# Patient Record
Sex: FEMALE | Race: OTHER | Employment: STUDENT | URBAN - METROPOLITAN AREA
[De-identification: names, ages, dates, MRNs, and addresses within clinical notes are randomized per-mention and may not be internally consistent; named-entity substitution may affect disease eponyms.]

---
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
## 2024-02-11 ENCOUNTER — HOSPITAL ENCOUNTER (EMERGENCY)
Facility: HOSPITAL | Age: 19
Discharge: HOME | End: 2024-02-11
Attending: EMERGENCY MEDICINE
Payer: COMMERCIAL

## 2024-02-11 VITALS
BODY MASS INDEX: 24.84 KG/M2 | OXYGEN SATURATION: 100 % | HEART RATE: 63 BPM | RESPIRATION RATE: 20 BRPM | HEIGHT: 62 IN | DIASTOLIC BLOOD PRESSURE: 73 MMHG | WEIGHT: 135 LBS | TEMPERATURE: 98 F | SYSTOLIC BLOOD PRESSURE: 110 MMHG

## 2024-02-11 DIAGNOSIS — N30.00 ACUTE CYSTITIS WITHOUT HEMATURIA: Primary | ICD-10-CM

## 2024-02-11 LAB
BILIRUB UR QL: NEGATIVE
GLUCOSE UR-MCNC: NORMAL MG/DL
KETONES UR-MCNC: NEGATIVE MG/DL
LEUKOCYTE ESTERASE UR QL STRIP.AUTO: 500
NITRITE UR QL STRIP.AUTO: NEGATIVE
PH UR: 5.5 [PH] (ref 5–8)
PROT UR-MCNC: 50 MG/DL
RBC #/AREA URNS AUTO: >10 /HPF
SP GR UR STRIP: 1.02 (ref 1–1.03)
UROBILINOGEN UR STRIP-ACNC: NORMAL
WBC #/AREA URNS AUTO: >50 /HPF

## 2024-02-11 PROCEDURE — 81001 URINALYSIS AUTO W/SCOPE: CPT | Performed by: EMERGENCY MEDICINE

## 2024-02-11 PROCEDURE — 99283 EMERGENCY DEPT VISIT LOW MDM: CPT

## 2024-02-11 PROCEDURE — 99284 EMERGENCY DEPT VISIT MOD MDM: CPT

## 2024-02-11 RX ORDER — CEPHALEXIN 500 MG/1
500 CAPSULE ORAL ONCE
Status: COMPLETED | OUTPATIENT
Start: 2024-02-11 | End: 2024-02-11

## 2024-02-11 RX ORDER — CEPHALEXIN 500 MG/1
500 CAPSULE ORAL 4 TIMES DAILY
Qty: 28 CAPSULE | Refills: 0 | Status: SHIPPED | OUTPATIENT
Start: 2024-02-11 | End: 2024-02-18

## 2024-02-11 NOTE — ED INITIAL ASSESSMENT (HPI)
Pt presents to ed with c/o urinary symptoms. Pt states vaginal pain, dysuria, frequency, and hematuria since last night. Pt states there is blood in her urine but is unsure if it is from her menstrual period.     No meds pta.

## 2024-02-12 NOTE — ED PROVIDER NOTES
Patient Seen in: [facility] Emergency Department    History     Chief Complaint   Patient presents with    Urinary Symptoms       HPI    The patient presents to the ED complaining of dysuria and frequency as well as some blood in her urine and starting last night.  She denies any flank pain or fevers.  Denies other complaints.    History reviewed.   Past Medical History:   Diagnosis Date    Visual impairment     glasses       History reviewed.   Past Surgical History:   Procedure Laterality Date    KNEE SURGERY Right 12/2021    KNEE SURGERY Left 07/2021         Medications :  (Not in a hospital admission)       Family History   Problem Relation Age of Onset    No Known Problems Father     No Known Problems Mother     Diabetes Maternal Grandmother     Diabetes Maternal Grandfather     Asthma Brother     Breast Cancer Neg     Ovarian Cancer Neg     Uterine Cancer Neg     Colon Cancer Neg        Smoking Status:   Social History     Socioeconomic History    Marital status: Single   Tobacco Use    Smoking status: Never    Smokeless tobacco: Never   Vaping Use    Vaping Use: Never used   Substance and Sexual Activity    Alcohol use: Never    Drug use: Never    Sexual activity: Yes     Partners: Male     Birth control/protection: OCP, Condom   Other Topics Concern    Second-hand smoke exposure No     Comment: Dad-outside    Alcohol/drug concerns No    Violence concerns No       Constitutional and vital signs reviewed.      Social History and Family History elements reviewed from today, pertinent positives to the presenting problem noted.    Physical Exam     ED Triage Vitals [02/11/24 1709]   BP (!) 142/95   Pulse 73   Resp 18   Temp 97.6 °F (36.4 °C)   Temp src Temporal   SpO2 100 %   O2 Device None (Room air)       All measures to prevent infection transmission during my interaction with the patient were taken. The patient was already wearing a droplet mask on my arrival to the room. Personal protective  equipment was worn throughout the duration of the exam.  Handwashing was performed prior to and after the exam.  Stethoscope and any equipment used during my examination was cleaned with super sani-cloth germicidal wipes following the exam.     Physical Exam  Vitals and nursing note reviewed.   Constitutional:       General: She is not in acute distress.     Appearance: She is well-developed.   HENT:      Head: Normocephalic and atraumatic.   Eyes:      General:         Right eye: No discharge.         Left eye: No discharge.      Conjunctiva/sclera: Conjunctivae normal.   Neck:      Trachea: No tracheal deviation.   Cardiovascular:      Rate and Rhythm: Normal rate.   Pulmonary:      Effort: Pulmonary effort is normal. No respiratory distress.      Breath sounds: No stridor.   Abdominal:      General: There is no distension.      Palpations: Abdomen is soft.      Tenderness: There is no abdominal tenderness.   Musculoskeletal:         General: No deformity.   Skin:     General: Skin is warm and dry.   Neurological:      Mental Status: She is alert and oriented to person, place, and time.   Psychiatric:         Mood and Affect: Mood normal.         Behavior: Behavior normal.         ED Course        Labs Reviewed   URINALYSIS, ROUTINE - Abnormal; Notable for the following components:       Result Value    Urine Color Azra (*)     Clarity Urine Ex.Turbid (*)     Blood Urine 3+ (*)     Protein Urine 50 (*)     Leukocyte Esterase Urine 500 (*)     WBC Urine >50 (*)     RBC Urine >10 (*)     All other components within normal limits       As Interpreted by me    Imaging Results Available and Reviewed while in ED: No results found.  ED Medications Administered:   Medications   cephalexin (Keflex) cap 500 mg (500 mg Oral Given 2/11/24 1902)         MDM     Vitals:    02/11/24 1709 02/11/24 1900   BP: (!) 142/95 110/73   Pulse: 73 63   Resp: 18 20   Temp: 97.6 °F (36.4 °C)    TempSrc: Temporal    SpO2: 100% 100%   Weight:  61.2 kg    Height: 157.5 cm (5' 2\")      *I personally reviewed and interpreted all ED vitals.    Pulse Ox: 100%, Room air, Normal     Monitor Interpretation:   normal sinus rhythm as interpreted by me.  The cardiac monitor was ordered to monitor heart rate.    Differential Diagnosis/ Diagnostic Considerations: UTI, hematuria, other    Complicating Factors: The patient already has does not have any pertinent problems on file. to contribute to the complexity of this ED evaluation.    Medical Decision Making  The patient presents to the ED with urinary tract infection symptoms.  No flank pain or fever to suggest pyelonephritis.  Patient nondistressed on exam.  Will prescribe Keflex for treatment and recommended return precautions.    Problems Addressed:  Acute cystitis without hematuria: acute illness or injury    Amount and/or Complexity of Data Reviewed  Labs: ordered. Decision-making details documented in ED Course.    Risk  Prescription drug management.        Condition upon leaving the department: Stable    Disposition and Plan     Clinical Impression:  1. Acute cystitis without hematuria        Disposition:  Discharge    Follow-up:  No follow-up provider specified.    Medications Prescribed:  Discharge Medication List as of 2/11/2024  7:03 PM        START taking these medications    Details   cephalexin (KEFLEX) 500 MG Oral Cap Take 1 capsule (500 mg total) by mouth 4 (four) times daily for 7 days., Normal, Disp-28 capsule, R-0

## 2024-03-12 ENCOUNTER — TELEPHONE (OUTPATIENT)
Dept: ORTHOPEDICS CLINIC | Facility: CLINIC | Age: 19
End: 2024-03-12

## 2024-03-12 ENCOUNTER — OFFICE VISIT (OUTPATIENT)
Facility: CLINIC | Age: 19
End: 2024-03-12
Payer: COMMERCIAL

## 2024-03-12 ENCOUNTER — HOSPITAL ENCOUNTER (OUTPATIENT)
Dept: GENERAL RADIOLOGY | Age: 19
Discharge: HOME | End: 2024-03-12
Attending: PHYSICIAN ASSISTANT
Payer: COMMERCIAL

## 2024-03-12 VITALS — WEIGHT: 135 LBS | BODY MASS INDEX: 24.84 KG/M2 | HEIGHT: 62 IN

## 2024-03-12 DIAGNOSIS — M25.562 LEFT KNEE PAIN, UNSPECIFIED CHRONICITY: ICD-10-CM

## 2024-03-12 DIAGNOSIS — M25.562 ACUTE PAIN OF LEFT KNEE: Primary | ICD-10-CM

## 2024-03-12 PROCEDURE — 99203 OFFICE O/P NEW LOW 30 MIN: CPT | Performed by: PHYSICIAN ASSISTANT

## 2024-03-12 PROCEDURE — 73564 X-RAY EXAM KNEE 4 OR MORE: CPT | Performed by: PHYSICIAN ASSISTANT

## 2024-03-12 RX ORDER — IBUPROFEN 600 MG/1
TABLET ORAL
COMMUNITY
Start: 2024-03-09

## 2024-03-12 NOTE — TELEPHONE ENCOUNTER
Fmla forms received via [employer]. Forms logged for processing. [employer] message sent for completion of ROSHAN.
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
## 2024-03-20 NOTE — TELEPHONE ENCOUNTER
Received 9 pgs fax from [employer] regarding patient's Short Term Disability forms, emailed and sent original to Forms department for completion.

## 2024-03-20 NOTE — TELEPHONE ENCOUNTER
Please try to avoid signing forms in the corner as it is not visible when printing and forms are not accepted this way. Thank you!     Mookie Day,      *The ACKNOWLEDGE button has been moved to the top right ribbon*    Please sign off on form if you agree to: FMLA   (place your signature on the first page only)    -From your Inbasket, Highlight the patient and click Chart   -Double click the 03/12/2024 Forms Completion telephone encounter  -Scroll down to the Media section   -Click the blue Hyperlink: NICOLAS Day 03/20/2024  -Click Acknowledge located in the top right ribbon/menu   -Drag the mouse into the blank space of the document and a + sign will appear. Left click to   electronically sign the document.     Thank you,  Kassy SESAY

## 2024-03-27 ENCOUNTER — OFFICE VISIT (OUTPATIENT)
Dept: ORTHOPEDICS CLINIC | Facility: CLINIC | Age: 19
End: 2024-03-27
Payer: COMMERCIAL

## 2024-03-27 DIAGNOSIS — M25.562 ACUTE PAIN OF LEFT KNEE: Primary | ICD-10-CM

## 2024-03-27 PROCEDURE — 99213 OFFICE O/P EST LOW 20 MIN: CPT | Performed by: PHYSICIAN ASSISTANT

## 2024-03-28 NOTE — TELEPHONE ENCOUNTER
Disab forms received via fax in forms dept. Logged for processing.  msg sent for ROSHAN completion.

## 2024-04-02 NOTE — TELEPHONE ENCOUNTER
NICOLAS Barton -     Please sign off on this form if you agree to:      STD - Left Knee Pain       Starting: 3/28/2024       Endin2024    (place your signature on the first page only)     -From your Inbasket, Highlight the patient and click \"Chart\"   -Double click the 3/12/2024 Forms Completion telephone encounter  -Scroll down to the Media section   -Click the blue Hyperlinks: ABRAHAM / NICOLAS Day / 2024  -Click Acknowledge located in the top right ribbon/menu (it  has been moved)  -Drag the mouse into the blank space of the document and a + sign will   appear. Left click to electronically sign the document.     Thank you for your time and assistance!     [facility]

## 2024-04-05 NOTE — TELEPHONE ENCOUNTER
STD forms have been completed, and sent to PT as an attachment, via [employer], due to missing ROSHAN/Authorization for Alli Kumar.

## 2024-08-28 ENCOUNTER — HOSPITAL ENCOUNTER (OUTPATIENT)
Age: 19
Discharge: HOME | End: 2024-08-28
Payer: COMMERCIAL

## 2024-08-28 VITALS
DIASTOLIC BLOOD PRESSURE: 74 MMHG | HEART RATE: 91 BPM | RESPIRATION RATE: 20 BRPM | SYSTOLIC BLOOD PRESSURE: 116 MMHG | OXYGEN SATURATION: 100 % | TEMPERATURE: 98 F

## 2024-08-28 DIAGNOSIS — Z20.822 COVID-19 RULED OUT BY LABORATORY TESTING: ICD-10-CM

## 2024-08-28 DIAGNOSIS — J06.9 VIRAL URI WITH COUGH: Primary | ICD-10-CM

## 2024-08-28 LAB
S PYO AG THROAT QL: NEGATIVE
SARS-COV-2 RNA RESP QL NAA+PROBE: NOT DETECTED

## 2024-08-28 PROCEDURE — 99214 OFFICE O/P EST MOD 30 MIN: CPT | Performed by: NURSE PRACTITIONER

## 2024-08-28 PROCEDURE — 87880 STREP A ASSAY W/OPTIC: CPT | Performed by: NURSE PRACTITIONER

## 2024-08-28 PROCEDURE — U0002 COVID-19 LAB TEST NON-CDC: HCPCS | Performed by: NURSE PRACTITIONER

## 2024-08-28 RX ORDER — BENZONATATE 100 MG/1
100 CAPSULE ORAL 3 TIMES DAILY PRN
Qty: 30 CAPSULE | Refills: 0 | Status: SHIPPED | OUTPATIENT
Start: 2024-08-28 | End: 2024-09-07

## 2024-08-28 RX ORDER — ALBUTEROL SULFATE 90 UG/1
2 AEROSOL, METERED RESPIRATORY (INHALATION) EVERY 4 HOURS PRN
Qty: 1 EACH | Refills: 0 | Status: SHIPPED | OUTPATIENT
Start: 2024-08-28 | End: 2024-09-27

## 2024-08-29 NOTE — ED PROVIDER NOTES
Patient Seen in: [facility]      History     Chief Complaint   Patient presents with    Cough     Stated Complaint: COUGH    Subjective: This is a 19-year-old female, no significant past medical history, presents to immediate care clinic for evaluation of cough, congestion, sore throat for the past 3 days.  Cough is dry and nonproductive.  Patient denies any headache, fever, chills, fatigue.  She is without ear pain, postnasal drip, abdominal pain, nausea, vomiting, diarrhea.  Positive recent travel to [place].  Patient has been taking DayQuil and NyQuil to minimal alleviation of symptoms.  She is a non-smoker.  Well-appearing.  No chest pain, dizziness, lightheadedness, palpitations, shortness of breath.  Speaking in complete and full sentences without difficulty.  AOx4.  The history is provided by the patient.           Objective:   Past Medical History:    Visual impairment    glasses              Past Surgical History:   Procedure Laterality Date    Knee surgery Right 12/2021    Knee surgery Left 07/2021                Social History     Socioeconomic History    Marital status: Single   Tobacco Use    Smoking status: Never    Smokeless tobacco: Never    Tobacco comments:     Updated 3/12/24   Vaping Use    Vaping status: Never Used   Substance and Sexual Activity    Alcohol use: Never    Drug use: Never    Sexual activity: Yes     Partners: Male     Birth control/protection: OCP, Condom   Other Topics Concern    Second-hand smoke exposure No     Comment: Dad-outside    Alcohol/drug concerns No    Violence concerns No   Social History Narrative    6th grade                    Review of Systems   Constitutional: Negative.  Negative for activity change, appetite change, chills, fatigue and fever.   HENT:  Positive for congestion and sore throat. Negative for ear discharge, ear pain, postnasal drip, rhinorrhea, sinus pressure, sinus pain and sneezing.    Eyes: Negative.    Respiratory:  Positive for  cough. Negative for chest tightness, shortness of breath, wheezing and stridor.    Cardiovascular: Negative.  Negative for chest pain, palpitations and leg swelling.   Gastrointestinal: Negative.  Negative for abdominal pain, diarrhea, nausea and vomiting.   Musculoskeletal: Negative.    Skin: Negative.    Neurological: Negative.        Positive for stated Chief Complaint: Cough    Other systems are as noted in HPI.  Constitutional and vital signs reviewed.      All other systems reviewed and negative except as noted above.    Physical Exam     ED Triage Vitals   BP 08/28/24 1919 116/74   Pulse 08/28/24 1919 91   Resp 08/28/24 1919 20   Temp 08/28/24 1920 97.8 °F (36.6 °C)   Temp src 08/28/24 1920 Oral   SpO2 08/28/24 1919 100 %   O2 Device 08/28/24 1919 None (Room air)       Current Vitals:   Vital Signs  BP: 116/74  Pulse: 91  Resp: 20  Temp: 97.8 °F (36.6 °C)  Temp src: Oral    Oxygen Therapy  SpO2: 100 %  O2 Device: None (Room air)            Physical Exam  Constitutional:       General: She is not in acute distress.     Appearance: Normal appearance. She is not ill-appearing.   HENT:      Head: Normocephalic.      Jaw: There is normal jaw occlusion.      Right Ear: Tympanic membrane, ear canal and external ear normal.      Left Ear: Tympanic membrane, ear canal and external ear normal.      Nose: Congestion present. No rhinorrhea.      Mouth/Throat:      Lips: Pink.      Mouth: Mucous membranes are moist.      Pharynx: Oropharynx is clear. Uvula midline. Posterior oropharyngeal erythema present. No oropharyngeal exudate or uvula swelling.   Eyes:      General:         Right eye: No discharge.         Left eye: No discharge.      Extraocular Movements: Extraocular movements intact.      Pupils: Pupils are equal, round, and reactive to light.   Cardiovascular:      Rate and Rhythm: Normal rate and regular rhythm.      Pulses: Normal pulses.      Heart sounds: Normal heart sounds.   Pulmonary:      Effort:  Pulmonary effort is normal. No respiratory distress.      Breath sounds: Normal breath sounds. No stridor. No wheezing, rhonchi or rales.   Chest:      Chest wall: No tenderness.   Musculoskeletal:         General: Normal range of motion.      Cervical back: Normal range of motion and neck supple.   Lymphadenopathy:      Cervical: No cervical adenopathy.   Skin:     General: Skin is warm.      Capillary Refill: Capillary refill takes less than 2 seconds.   Neurological:      General: No focal deficit present.      Mental Status: She is alert and oriented to person, place, and time.               ED Course     Labs Reviewed   POCT RAPID STREP - Normal   RAPID SARS-COV-2 BY PCR - Normal                      MDM      Differentials considered include: COVID-19, strep pharyngitis, viral URI    Patient with dry cough on exam.  Lungs auscultated, no wheezing, crackles, consolidation.  No diminished breath sounds.  No tachypnea, tachycardia, hypoxia.  No evidence of pneumonia.  Low suspicion for bronchitis.    Patient posterior pharynx erythematous.  1+ tonsil enlargement without edema or exudate.  Uvula midline and normal in size per mucous membranes moist.  No intraoral lesions petechiae.  No cervical lymphadenopathy.  Patient is tolerating her own secretions well without difficulty.  No excessive drooling, stridor, voice change.  No evidence to suggest peritonsillar abscess.    Patient is negative on rapid strep test.    Patient is negative for COVID-19.    Likely viral URI.  Educated patient on supportive symptomatic treatment at home.  She is aware to drink plenty of water and get plenty of rest.  She should sleep somewhat elevated and upright.  Sleep with humidifier.  Steam showers cough and congestion.    For throat discomfort, patient is aware to perform salt water gargles daily, drink warm tea with honey and lemon, Cepacol lozenges over-the-counter.    I did prescribe Tessalon Perles that she can take up to 3 times  a day and albuterol inhaler as needed for coughing fits.    Patient is aware that most respiratory viruses take about 2 to 3 weeks to resolve with coughing last symptom to resolve.  Patient verbalized understanding.    Patient is aware of signs symptoms that warrant immediate ER evaluation.  She verbalized understand agrees with plan of care.                                     Medical Decision Making      Disposition and Plan     Clinical Impression:  1. Viral URI with cough    2. COVID-19 ruled out by laboratory testing         Disposition:  Discharge  8/28/2024  7:53 pm    Follow-up:  Kristy Dempsey MD  [address] [place] [id number] [phone]      As needed          Medications Prescribed:  Discharge Medication List as of 8/28/2024  7:54 PM        START taking these medications    Details   benzonatate 100 MG Oral Cap Take 1 capsule (100 mg total) by mouth 3 (three) times daily as needed for cough., Normal, Disp-30 capsule, R-0      albuterol 108 (90 Base) MCG/ACT Inhalation Aero Soln Inhale 2 puffs into the lungs every 4 (four) hours as needed for Wheezing., Normal, Disp-1 each, R-0

## 2024-08-29 NOTE — ED INITIAL ASSESSMENT (HPI)
Cough, nasal congestion x3 days. Reports throat irritation from cough. Denies fever. Taking dayquil and nyquil.

## 2024-08-29 NOTE — DISCHARGE INSTRUCTIONS
You are negative for strep throat.  You are negative for COVID-19.  Lungs are clear.  Likely viral illness.  Supportive and symptomatic treatment at home.  Drink plenty of water and electrolytes.  Get plenty of rest.  Sleep somewhat elevated upright.  Sleep with humidifier.  Steam showers for cough and congestion.    For throat discomfort, recommend salt water gargles, warm tea with honey and lemon, Cepacol lozenges over-the-counter.    Tylenol every 4 hours Motrin every 6 hours.    I have prescribed Tessalon Perles which you can take up to 3 times a day as needed for cough suppressant. Albuterol inhaler for any coughing fits or wheezing.    Please be aware that most respiratory viruses take about 2 weeks to resolve.    Please go to ER if you have any chest pain, dizziness, lightness, palpitations, shortness of breath.

## 2024-09-16 ENCOUNTER — LAB ENCOUNTER (OUTPATIENT)
Dept: LAB | Age: 19
End: 2024-09-16
Attending: FAMILY MEDICINE
Payer: COMMERCIAL

## 2024-09-16 ENCOUNTER — OFFICE VISIT (OUTPATIENT)
Dept: FAMILY MEDICINE CLINIC | Facility: CLINIC | Age: 19
End: 2024-09-16

## 2024-09-16 VITALS
TEMPERATURE: 98 F | WEIGHT: 138 LBS | DIASTOLIC BLOOD PRESSURE: 81 MMHG | BODY MASS INDEX: 25.4 KG/M2 | HEIGHT: 62 IN | SYSTOLIC BLOOD PRESSURE: 119 MMHG | HEART RATE: 67 BPM

## 2024-09-16 DIAGNOSIS — Z23 NEED FOR TDAP VACCINATION: ICD-10-CM

## 2024-09-16 DIAGNOSIS — R05.1 ACUTE COUGH: ICD-10-CM

## 2024-09-16 DIAGNOSIS — R49.0 VOICE HOARSENESS: ICD-10-CM

## 2024-09-16 DIAGNOSIS — Z00.00 WELL ADULT EXAM: Primary | ICD-10-CM

## 2024-09-16 LAB
ALBUMIN SERPL-MCNC: 4.5 G/DL (ref 3.2–4.8)
ALBUMIN/GLOB SERPL: 1.6 {RATIO} (ref 1–2)
ALP LIVER SERPL-CCNC: 84 U/L
ALT SERPL-CCNC: 23 U/L
ANION GAP SERPL CALC-SCNC: 7 MMOL/L (ref 0–18)
AST SERPL-CCNC: 24 U/L (ref ?–34)
BASOPHILS # BLD AUTO: 0.04 X10(3) UL (ref 0–0.2)
BASOPHILS NFR BLD AUTO: 0.5 %
BILIRUB SERPL-MCNC: 0.6 MG/DL (ref 0.3–1.2)
BUN BLD-MCNC: 11 MG/DL (ref 9–23)
BUN/CREAT SERPL: 15.9 (ref 10–20)
CALCIUM BLD-MCNC: 9.5 MG/DL (ref 8.7–10.4)
CHLORIDE SERPL-SCNC: 105 MMOL/L (ref 98–112)
CHOLEST SERPL-MCNC: 210 MG/DL (ref ?–200)
CO2 SERPL-SCNC: 28 MMOL/L (ref 21–32)
CREAT BLD-MCNC: 0.69 MG/DL
DEPRECATED RDW RBC AUTO: 47.2 FL (ref 35.1–46.3)
EGFRCR SERPLBLD CKD-EPI 2021: 127 ML/MIN/1.73M2 (ref 60–?)
EOSINOPHIL # BLD AUTO: 0.18 X10(3) UL (ref 0–0.7)
EOSINOPHIL NFR BLD AUTO: 2.3 %
ERYTHROCYTE [DISTWIDTH] IN BLOOD BY AUTOMATED COUNT: 14.6 % (ref 11–15)
FASTING PATIENT LIPID ANSWER: YES
FASTING STATUS PATIENT QL REPORTED: YES
GLOBULIN PLAS-MCNC: 2.9 G/DL (ref 2–3.5)
GLUCOSE BLD-MCNC: 79 MG/DL (ref 70–99)
HCT VFR BLD AUTO: 42 %
HDLC SERPL-MCNC: 53 MG/DL (ref 40–59)
HGB BLD-MCNC: 13.5 G/DL
IMM GRANULOCYTES # BLD AUTO: 0.02 X10(3) UL (ref 0–1)
IMM GRANULOCYTES NFR BLD: 0.3 %
LDLC SERPL CALC-MCNC: 141 MG/DL (ref ?–100)
LYMPHOCYTES # BLD AUTO: 2.94 X10(3) UL (ref 1–4)
LYMPHOCYTES NFR BLD AUTO: 36.9 %
MCH RBC QN AUTO: 28.8 PG (ref 26–34)
MCHC RBC AUTO-ENTMCNC: 32.1 G/DL (ref 31–37)
MCV RBC AUTO: 89.6 FL
MONOCYTES # BLD AUTO: 0.56 X10(3) UL (ref 0.1–1)
MONOCYTES NFR BLD AUTO: 7 %
NEUTROPHILS # BLD AUTO: 4.22 X10 (3) UL (ref 1.5–7.7)
NEUTROPHILS # BLD AUTO: 4.22 X10(3) UL (ref 1.5–7.7)
NEUTROPHILS NFR BLD AUTO: 53 %
NONHDLC SERPL-MCNC: 157 MG/DL (ref ?–130)
OSMOLALITY SERPL CALC.SUM OF ELEC: 288 MOSM/KG (ref 275–295)
PLATELET # BLD AUTO: 391 10(3)UL (ref 150–450)
POTASSIUM SERPL-SCNC: 3.8 MMOL/L (ref 3.5–5.1)
PROT SERPL-MCNC: 7.4 G/DL (ref 5.7–8.2)
RBC # BLD AUTO: 4.69 X10(6)UL
SODIUM SERPL-SCNC: 140 MMOL/L (ref 136–145)
TRIGL SERPL-MCNC: 88 MG/DL (ref 30–149)
TSI SER-ACNC: 1.52 MIU/ML (ref 0.55–4.78)
VLDLC SERPL CALC-MCNC: 16 MG/DL (ref 0–30)
WBC # BLD AUTO: 8 X10(3) UL (ref 4–11)

## 2024-09-16 PROCEDURE — 80053 COMPREHEN METABOLIC PANEL: CPT | Performed by: FAMILY MEDICINE

## 2024-09-16 PROCEDURE — 36415 COLL VENOUS BLD VENIPUNCTURE: CPT | Performed by: FAMILY MEDICINE

## 2024-09-16 PROCEDURE — 85025 COMPLETE CBC W/AUTO DIFF WBC: CPT | Performed by: FAMILY MEDICINE

## 2024-09-16 PROCEDURE — 80061 LIPID PANEL: CPT | Performed by: FAMILY MEDICINE

## 2024-09-16 PROCEDURE — 84443 ASSAY THYROID STIM HORMONE: CPT | Performed by: FAMILY MEDICINE

## 2024-09-16 RX ORDER — FLUTICASONE PROPIONATE 50 MCG
2 SPRAY, SUSPENSION (ML) NASAL NIGHTLY
Qty: 1 EACH | Refills: 0 | Status: SHIPPED | OUTPATIENT
Start: 2024-09-16 | End: 2025-09-11

## 2024-09-16 NOTE — PROGRESS NOTES
HPI:    Patient ID: Lilliam Temple is a 19 year old female.    HPI  Chief Complaint   Patient presents with    Well Adult    Urgent Care F/u     Still has a cough for over two weeks sometimes gasps for air has been using the inhaler          Wt Readings from Last 6 Encounters:   09/16/24 138 lb (62.6 kg)   03/12/24 135 lb (61.2 kg)   02/11/24 135 lb (61.2 kg)   11/14/23 134 lb (60.8 kg) (60%, Z= 0.26)*   10/12/23 134 lb 14.7 oz (61.2 kg) (62%, Z= 0.31)*   10/12/23 135 lb (61.2 kg) (62%, Z= 0.31)*     * Growth percentiles are based on [facility] (Girls, 2-20 Years) data.     BP Readings from Last 3 Encounters:   09/16/24 119/81   08/28/24 116/74   02/11/24 110/73       New patient   Was seen for cough 8/28  Feels better  Using albuterol and helps.  Sometimes wheezing.    Had no illness, just had a dry cough then had productive  No fever, no nasal congestion  Voice hoarseness +  No shortness of breath     On ocps since 4-5 yrs.  No leg pain/ no leg swelling    Past knee injuries/ surgeries.    Working as a phlebotist at [employer]  In school - wants to be dental hygienist    Non smoker    Sexually active     Review of Systems   Constitutional:  Negative for activity change, appetite change, chills, fatigue, fever and unexpected weight change.   HENT:  Negative for congestion, dental problem, drooling, ear discharge, ear pain, facial swelling, hearing loss, mouth sores, nosebleeds, postnasal drip, rhinorrhea, sinus pressure, sinus pain, sneezing, sore throat, tinnitus, trouble swallowing and voice change.    Eyes:  Negative for pain, discharge, redness and visual disturbance.   Respiratory:  Positive for cough. Negative for shortness of breath and wheezing.    Cardiovascular:  Negative for chest pain, palpitations and leg swelling.   Gastrointestinal:  Negative for abdominal pain, anal bleeding, blood in stool, constipation, diarrhea, nausea, rectal pain and vomiting.   Endocrine: Negative for cold intolerance, heat  intolerance, polydipsia, polyphagia and polyuria.   Genitourinary:  Negative for decreased urine volume, difficulty urinating, dysuria, flank pain, frequency, menstrual problem, pelvic pain, urgency, vaginal bleeding, vaginal discharge and vaginal pain.        Periods are regular     Musculoskeletal:  Negative for arthralgias, back pain and myalgias.   Skin:  Negative for rash.   Neurological:  Negative for dizziness, seizures, syncope, weakness, numbness and headaches.   Hematological:  Does not bruise/bleed easily.   Psychiatric/Behavioral:  Negative for behavioral problems, decreased concentration, self-injury, sleep disturbance and suicidal ideas. The patient is not nervous/anxious.        /81   Pulse 67   Temp 97.5 °F (36.4 °C)   Ht 5' 2\" (1.575 m)   Wt 138 lb (62.6 kg)   LMP 09/12/2024 (Exact Date)   BMI 25.24 kg/m²     Past Medical History:    Visual impairment    glasses     Past Surgical History:   Procedure Laterality Date    Knee surgery Right 12/2021    [facility]    Knee surgery Left 07/2021    [facility]     Social History     Socioeconomic History    Marital status: Single     Spouse name: Not on file    Number of children: Not on file    Years of education: Not on file    Highest education level: Not on file   Occupational History    Not on file   Tobacco Use    Smoking status: Never    Smokeless tobacco: Never    Tobacco comments:     Updated 3/12/24   Vaping Use    Vaping status: Never Used   Substance and Sexual Activity    Alcohol use: Never    Drug use: Never    Sexual activity: Yes     Partners: Male     Birth control/protection: OCP, Condom   Other Topics Concern    Second-hand smoke exposure No     Comment: Dad-outside    Alcohol/drug concerns No    Violence concerns No   Social History Narrative    6th grade           Social Determinants of Health     Financial Resource Strain: Not on file   Food Insecurity: Not on file   Transportation Needs: Not on file   Physical Activity: Not on file    Stress: Not on file   Social Connections: Not on file   Housing Stability: Not on file     Family History   Problem Relation Age of Onset    No Known Problems Father     No Known Problems Mother     Diabetes Maternal Grandmother     Diabetes Maternal Grandfather     Asthma Brother     Breast Cancer Neg     Ovarian Cancer Neg     Uterine Cancer Neg     Colon Cancer Neg        Immunization History   Administered Date(s) Administered    Covid-19 Vaccine Pfizer 30 mcg/0.3 ml 05/01/2021, 05/22/2021    DTAP 04/13/2004, 06/14/2004, 08/10/2004, 05/10/2005, 08/06/2009    FLU VAC QIV SPLIT 3 YRS AND OLDER (87010) 01/08/2022    FLUZONE 6 months and older PFS 0.5 ml (73321) 09/24/2020    HEP A,Ped/Adol,(2 Dose) 07/11/2019, 03/13/2021    HEP B 04/13/2004, 06/14/2004, 08/10/2004    HIB 04/13/2004, 06/14/2004, 08/10/2004, 05/10/2005    Hpv Virus Vaccine 9 Jessie Im 05/24/2017, 06/01/2018    IPV 04/13/2004, 06/14/2004, 08/10/2004, 08/06/2009    MMR 02/08/2005, 08/06/2009    Meningococcal-Menactra 04/02/2015    Meningococcal-Menveo 2month-55yr 03/13/2021    Pneumococcal Vaccine, Conjugate 04/13/2004, 06/14/2004, 08/10/2004, 08/16/2005    TDAP 03/20/2014    Varicella 02/08/2005, 08/06/2009       Health Maintenance   Topic Date Due    Annual Depression Screening  01/01/2024    Annual Physical  03/15/2024    DTaP,Tdap,and Td Vaccines (7 - Td or Tdap) 03/20/2024    COVID-19 Vaccine (3 - 2023-24 season) 09/01/2024    Influenza Vaccine (1) 10/01/2024    Chlamydia Screening  11/14/2024    Pneumococcal Vaccine: Birth to 64yrs  Completed    Hepatitis B Vaccines  Completed    Hepatitis A Vaccines  Completed    MMR Vaccines  Completed    Varicella Vaccines  Completed    Meningococcal Vaccine  Completed    HPV Vaccines  Completed          Current Outpatient Medications   Medication Sig Dispense Refill    fluticasone propionate 50 MCG/ACT Nasal Suspension 2 sprays by Each Nare route nightly. 1 each 0    albuterol 108 (90 Base) MCG/ACT Inhalation  Aero Soln Inhale 2 puffs into the lungs every 4 (four) hours as needed for Wheezing. 1 each 0    ibuprofen 600 MG Oral Tab TAKE 1 TABLET BY MOUTH EVERY 6 HOURS FOR 5 DAYS      Norethin Ace-Eth Estrad-FE 1-20 MG-MCG Oral Tab Take 1 tablet by mouth daily. 84 tablet 4     Allergies:No Known Allergies   PHYSICAL EXAM:     Chief Complaint   Patient presents with    Well Adult    Urgent Care F/u     Still has a cough for over two weeks sometimes gasps for air has been using the inhaler         Physical Exam  Vitals and nursing note reviewed.   Constitutional:       Appearance: She is well-developed.   HENT:      Head: Normocephalic and atraumatic.      Right Ear: External ear normal.      Left Ear: External ear normal.      Nose: Nose normal.      Mouth/Throat:      Pharynx: No oropharyngeal exudate.   Eyes:      General:         Right eye: No discharge.         Left eye: No discharge.      Conjunctiva/sclera: Conjunctivae normal.      Pupils: Pupils are equal, round, and reactive to light.   Neck:      Thyroid: No thyromegaly.   Cardiovascular:      Rate and Rhythm: Normal rate and regular rhythm.      Heart sounds: Normal heart sounds. No murmur heard.  Pulmonary:      Effort: Pulmonary effort is normal.      Breath sounds: Normal breath sounds. No wheezing.   Abdominal:      General: Bowel sounds are normal.      Palpations: Abdomen is soft. There is no mass.      Tenderness: There is no abdominal tenderness.   Musculoskeletal:         General: No tenderness.      Cervical back: Normal range of motion and neck supple.   Lymphadenopathy:      Cervical: No cervical adenopathy.   Skin:     General: Skin is dry.      Findings: No rash.   Neurological:      Mental Status: She is alert and oriented to person, place, and time.      Cranial Nerves: No cranial nerve deficit.      Motor: No abnormal muscle tone.      Coordination: Coordination normal.      Deep Tendon Reflexes: Reflexes are normal and symmetric. Reflexes normal.    Psychiatric:         Behavior: Behavior normal.         Thought Content: Thought content normal.         Judgment: Judgment normal.                ASSESSMENT/PLAN:     Return yearly for physicals  Follow up with dentist every 6 months  Follow up with eye doctor yearly  Recommend aerobic exercise for at least 30mins 5 days a week  Yearly flu shot  Tetanus booster every 10 years (Tdap/ Td)  Labs ordered/ or reviewed if done prior to appointment     Encounter Diagnoses   Name Primary?    Well adult exam Yes    Need for Tdap vaccination     Acute cough     Voice hoarseness        1. Well adult exam    - CBC With Differential With Platelet  - Comp Metabolic Panel (14)  - Lipid Panel  - TSH W Reflex To Free T4    2. Need for Tdap vaccination  tdap    3. Acute cough  Better   Add the flonase  - fluticasone propionate 50 MCG/ACT Nasal Suspension; 2 sprays by Each Nare route nightly.  Dispense: 1 each; Refill: 0    4. Voice hoarseness  Add flonase, continue antihistamine  - fluticasone propionate 50 MCG/ACT Nasal Suspension; 2 sprays by Each Nare route nightly.  Dispense: 1 each; Refill: 0      Orders Placed This Encounter   Procedures    CBC With Differential With Platelet    Comp Metabolic Panel (14)    Lipid Panel    TSH W Reflex To Free T4       The above note was creating using Dragon speech recognition technology. Please excuse any typos    Meds This Visit:  Requested Prescriptions     Signed Prescriptions Disp Refills    fluticasone propionate 50 MCG/ACT Nasal Suspension 1 each 0     Si sprays by Each Nare route nightly.       Imaging & Referrals:  None       ID#[id number]

## 2025-05-30 ENCOUNTER — NURSE TRIAGE (OUTPATIENT)
Dept: FAMILY MEDICINE CLINIC | Facility: CLINIC | Age: 20
End: 2025-05-30

## 2025-05-30 DIAGNOSIS — Z23 NEED FOR VACCINATION: Primary | ICD-10-CM

## 2025-05-30 NOTE — TELEPHONE ENCOUNTER
Dr Mills ==see below, please review lab result and order if booster is needed.     Please reply to pool: EM RN TRIAGE       Patient wants to follow up on her lab results.   Inform that we have the final results, but Dr. Mills still needs to review them.   She is also asking if she needs to schedule for a booster if Dr. Mills recommends it.       Informed that RN will send the message to Dr. Mills to review the test results and get an order for the booster if needed. She needs to contact our office at [phone] say APPOINTMENT to schedule an RN visit. States that her school form for the immunization is due on 6/12/25.        LAST visit 9/16/24.       No future appointments.

## 2025-05-31 NOTE — TELEPHONE ENCOUNTER
Patient reviewed results and plan in result notes as per below:  Sanjay Mills MD to Lilliam Temple        5/30/25  7:09 PM  You are immune to MMR, and hep B but NOT to chicken pox,  You will need 2 doses of chicken pox vaccine 1 month apart as long as not pregnant.  TB blood test is negative.  Please schedule nurse visit for chicken pox vaccine  Results released through My Chart.       Last read by Lilliam Temple at 7:45PM on 5/30/2025.  Hepatitis B Surface Antibody; Mumps Antibodies, IGG-Immunity; Rubella Antibodies, IgG; Rubeola(Measles)Antibodies, IGG-Immunity; Varicella Zoster, IGG (1 more orders)

## 2025-06-04 ENCOUNTER — NURSE ONLY (OUTPATIENT)
Dept: FAMILY MEDICINE CLINIC | Facility: CLINIC | Age: 20
End: 2025-06-04
Payer: COMMERCIAL

## 2025-06-04 DIAGNOSIS — Z23 NEED FOR TDAP VACCINATION: Primary | ICD-10-CM

## 2025-06-04 PROCEDURE — 90471 IMMUNIZATION ADMIN: CPT | Performed by: FAMILY MEDICINE

## 2025-06-04 PROCEDURE — 90716 VAR VACCINE LIVE SUBQ: CPT | Performed by: FAMILY MEDICINE

## 2025-06-06 ENCOUNTER — APPOINTMENT (OUTPATIENT)
Dept: OCCUPATIONAL MEDICINE | Age: 20
End: 2025-06-06
Attending: NURSE PRACTITIONER

## 2025-06-27 ENCOUNTER — PATIENT MESSAGE (OUTPATIENT)
Dept: FAMILY MEDICINE CLINIC | Facility: CLINIC | Age: 20
End: 2025-06-27

## 2025-07-05 ENCOUNTER — LAB ENCOUNTER (OUTPATIENT)
Dept: LAB | Age: 20
End: 2025-07-05
Attending: FAMILY MEDICINE
Payer: COMMERCIAL

## 2025-07-05 PROCEDURE — 36415 COLL VENOUS BLD VENIPUNCTURE: CPT | Performed by: FAMILY MEDICINE

## 2025-07-05 PROCEDURE — 86787 VARICELLA-ZOSTER ANTIBODY: CPT | Performed by: FAMILY MEDICINE

## 2025-07-07 LAB — VZV IGG SER IA-ACNC: 3.45 (ref 1–?)

## 2025-07-14 ENCOUNTER — OFFICE VISIT (OUTPATIENT)
Dept: OBGYN CLINIC | Facility: CLINIC | Age: 20
End: 2025-07-14
Payer: COMMERCIAL

## 2025-07-14 VITALS
BODY MASS INDEX: 26 KG/M2 | HEART RATE: 59 BPM | DIASTOLIC BLOOD PRESSURE: 69 MMHG | SYSTOLIC BLOOD PRESSURE: 106 MMHG | WEIGHT: 139.81 LBS

## 2025-07-14 DIAGNOSIS — Z12.4 SCREENING FOR CERVICAL CANCER: ICD-10-CM

## 2025-07-14 DIAGNOSIS — Z11.3 SCREENING EXAMINATION FOR STD (SEXUALLY TRANSMITTED DISEASE): ICD-10-CM

## 2025-07-14 DIAGNOSIS — Z30.011 ENCOUNTER FOR INITIAL PRESCRIPTION OF CONTRACEPTIVE PILLS: ICD-10-CM

## 2025-07-14 DIAGNOSIS — N89.8 VAGINAL DISCHARGE: ICD-10-CM

## 2025-07-14 DIAGNOSIS — Z01.419 ENCOUNTER FOR WELL WOMAN EXAM: Primary | ICD-10-CM

## 2025-07-14 PROCEDURE — 81514 NFCT DS BV&VAGINITIS DNA ALG: CPT | Performed by: STUDENT IN AN ORGANIZED HEALTH CARE EDUCATION/TRAINING PROGRAM

## 2025-07-14 PROCEDURE — 87591 N.GONORRHOEAE DNA AMP PROB: CPT | Performed by: STUDENT IN AN ORGANIZED HEALTH CARE EDUCATION/TRAINING PROGRAM

## 2025-07-14 PROCEDURE — 87491 CHLMYD TRACH DNA AMP PROBE: CPT | Performed by: STUDENT IN AN ORGANIZED HEALTH CARE EDUCATION/TRAINING PROGRAM

## 2025-07-14 RX ORDER — NORETHINDRONE ACETATE AND ETHINYL ESTRADIOL 1MG-20(21)
1 KIT ORAL DAILY
Qty: 28 UNDEFINED | Refills: 12 | Status: SHIPPED | OUTPATIENT
Start: 2025-07-14

## 2025-07-14 NOTE — PROGRESS NOTES
[facility]  Obstetrics and Gynecology  Annual  Shade Antonio PA-C      The following individual(s) verbally consented to be recorded using ambient AI listening technology and understand that they can each withdraw their consent to this listening technology at any point by asking the clinician to turn off or pause the recording:    Patient name: Lilliam Temple is a 20 year old female  presenting today for her annual well woman exam.     History of Present Illness  Lilliam Temple is a 20 year old female who presents for an annual physical exam.    She experiences irregular menstrual cycles since stopping birth control over a year ago, with periods skipping months or occurring late. The most recent cycle involved four to five days of brown spotting with clots. Previously, her periods were irregular and heavy, but now they are irregular and not heavy. There is no excessive facial hair growth or abnormal nipple discharge. No recent hormonal blood work has been done.    She has symptoms of bacterial vaginosis, including odor and itching, similar to past episodes. She has used gel and pill treatments before, preferring the pill. She is not using consistent contraception but occasionally uses condoms with her boyfriend of four years. Requests STD screening today.    She has no known medication allergies, denies diabetes, and has no family history of breast, ovarian, uterine, or colon cancer. She does not use tobacco. Denies any hematuria, dysuria, pain or lumps in the breasts, or other concerning symptoms.    Patient's last menstrual period was 2025 (exact date).     Pap: n/a   Contraception:none  Gardasil: completed    OBSTETRICS HISTORY:     OB History    Para Term  AB Living   0 0 0 0 0    SAB IAB Ectopic Multiple Live Births   0 0 0         GYNE HISTORY:     Menarche: 10 (2025  1:00 PM)  Period Cycle (Days): irregular (2025  1:00 PM)  Period Duration (Days): 7 days  (7/14/2025  1:00 PM)  Period Flow: heavy with clots over the last 2 periods (7/14/2025  1:00 PM)  Use of Birth Control (if yes, specify type): None (7/14/2025  1:00 PM)  Hx Prior Abnormal Pap: No (7/14/2025  1:00 PM)      History   Sexual Activity    Sexual activity: Yes    Partners: Male    Birth control/ protection: Condom, Contraceptive patch            No data to display                  MEDICAL HISTORY:     Past Medical History[1]   Past Surgical History[2]    SOCIAL HISTORY:     Tobacco Use: Low Risk  (7/14/2025)    Patient History     Smoking Tobacco Use: Never     Smokeless Tobacco Use: Never     Passive Exposure: Never       Depression Screening:   Depression Screening (PHQ-2/PHQ-9): Over the LAST 2 WEEKS   Little interest or pleasure in doing things (over the last two weeks)?: Not at all  Little interest or pleasure in doing things: Not at all    Feeling down, depressed, or hopeless (over the last two weeks)?: Not at all  Feeling down, depressed, or hopeless: Not at all    PHQ-2 SCORE: 0  PHQ-2 SCORE: 0          FAMILY HISTORY:     Family History[3]    MEDICATIONS:     Medications - Current[4]    ALLERGIES:     Allergies[5]    REVIEW OF SYSTEMS:     Review of Systems   Constitutional:  Negative for chills, fever and unexpected weight change.   Respiratory: Negative.     Cardiovascular: Negative.    Gastrointestinal:  Negative for abdominal pain, constipation, diarrhea and nausea.   Genitourinary:  Positive for vaginal bleeding. Negative for dyspareunia, dysuria, genital sores, hematuria, menstrual problem, pelvic pain, vaginal discharge and vaginal pain.   Musculoskeletal: Negative.    Skin: Negative.    Neurological: Negative.    Hematological: Negative.    Psychiatric/Behavioral: Negative.           PHYSICAL EXAM:     Vitals:    07/14/25 1257   BP: 106/69   Pulse: 59   Weight: 139 lb 12.8 oz (63.4 kg)       Body mass index is 25.57 kg/m².     Chaperone offered, pt declined.  Constitutional: well  developed, well nourished  Psychiatric:  Oriented to time, place, person and situation. Appropriate mood and affect  Head/Face: normocephalic  Neck/Thyroid: thyroid symmetric, no thyromegaly, no nodules, no adenopathy  Lymphatic:no abnormal supraclavicular or axillary adenopathy is noted  Breast: normal without palpable masses, tenderness, asymmetry, nipple discharge, nipple retraction or skin changes  Abdomen:  soft, nontender, nondistended, no masses  Skin/Hair: no unusual rashes or bruises  Extremities: no edema, no cyanosis    Pelvic Exam:  External Genitalia: normal appearance, hair distribution, and no lesions  Urethral Meatus:  normal in size, location, without lesions and prolapse  Bladder:  No fullness, masses or tenderness  Vagina:  Normal appearance without lesions, no abnormal discharge  Cervix:  Normal without tenderness on motion  Uterus: normal in size, contour, position, mobility, without tenderness  Adnexa: normal without masses or tenderness  Perineum: normal  Anus: no hemorroids       ASSESMENT & PLAN:     Assessment & Plan  Irregular Menstrual Cycles  Irregular cycles post-birth control discontinuation. Differential includes hormonal imbalances, PCOS, or stress. Previous thyroid function normal. Menstrual regulation possible with hormonal birth control.  - Initiate Loestrin birth control pills to regulate cycles.  - Consider hormonal workup if symptoms persist or worsen.    Contraception Counseling  Discussed contraceptive options including pills and IUDs. Prefers to avoid weight gain. Explained IUD insertion process and potential discomfort.  - Start Loestrin birth control pills as preferred.  - Provide information on IUDs for future consideration.    Vaginal discharge  Recurrent symptoms of odor and itching. Previous treatments included gel and pills.  - Perform vaginal cultures to confirm diagnosis.  - Hold off on antibiotics until culture results are available.  - Consider oral antibiotics if  cultures confirm bacterial vaginosis.    General Health Maintenance  Annual gynecological exam. No significant family history of cancer. Previous elevated cholesterol.  - Perform Pap smear.  - Perform STD screening including chlamydia, gonorrhea, and trichomonas.      SUMMARY:  Pap: Next cotest 3-5 years per ASCCP guidelines.  BCM:  None  STD screening: GC/Chl/Trich only, declines blood STD screen, condoms encouraged  Mammogram: n/a -- once 39 yrs old  HM updated    ORDERS:   No orders of the defined types were placed in this encounter.    PRESCRIPTIONS:     Requested Prescriptions      No prescriptions requested or ordered in this encounter     IMAGING/ REFERRALS:    None   FOLLOW-UP     No follow-ups on file.    TRAVIS BETANCOURT PA-C  1:02 PM  7/14/2025    Note to patient and family:  The 21st Century Cures Act makes medical notes available to patients in the interest of transparency.  However, please be advised that this is a medical document.  It is intended as a peer to peer communication.  It is written in medical language and may contain abbreviations or verbiage that are technical and unfamiliar.  It may appear blunt or direct.  Medical documents are intended to carry relevant information, facts as evident, and the clinical opinion of the practitioner.         [1]   Past Medical History:  Diagnosis Date    Visual impairment     glasses   [2]   Past Surgical History:  Procedure Laterality Date    Knee surgery Right 12/2021    [facility]    Knee surgery Left 07/2021    [facility]    Other surgical history  July and dec    Knee surgery on my left in july and right knee surgery in dec   [3]   Family History  Problem Relation Age of Onset    No Known Problems Father     No Known Problems Mother     Diabetes Maternal Grandmother     Diabetes Maternal Grandfather     Asthma Brother     Breast Cancer Neg     Ovarian Cancer Neg     Uterine Cancer Neg     Colon Cancer Neg    [4]   Current Outpatient Medications:     fluticasone  propionate 50 MCG/ACT Nasal Suspension, 2 sprays by Each Nare route nightly., Disp: 1 each, Rfl: 0    ibuprofen 600 MG Oral Tab, TAKE 1 TABLET BY MOUTH EVERY 6 HOURS FOR 5 DAYS, Disp: , Rfl:   [5] No Known Allergies

## 2025-07-15 LAB
BV BACTERIA DNA VAG QL NAA+PROBE: NEGATIVE
C GLABRATA DNA VAG QL NAA+PROBE: NEGATIVE
C KRUSEI DNA VAG QL NAA+PROBE: NEGATIVE
C TRACH DNA SPEC QL NAA+PROBE: NEGATIVE
CANDIDA DNA VAG QL NAA+PROBE: NEGATIVE
N GONORRHOEA DNA SPEC QL NAA+PROBE: NEGATIVE
T VAGINALIS DNA VAG QL NAA+PROBE: NEGATIVE

## (undated) DEVICE — 60 ML SYRINGE LUER-LOCK TIP: Brand: MONOJECT

## (undated) DEVICE — ABDOMINAL PAD: Brand: CURITY

## (undated) DEVICE — MEDI-VAC NON-CONDUCTIVE SUCTION TUBING: Brand: CARDINAL HEALTH

## (undated) DEVICE — INSTRUMENT 875-088 14MM DSTRCT PIN STRL: Brand: MEDTRONIC REUSABLE INSTRUMENT

## (undated) DEVICE — BLADE 11 SHRP BP SS SRG STRL

## (undated) DEVICE — BLADE SHVR COOLCUT 13CM 4MM

## (undated) DEVICE — NEEDLE HPO 18GA 1.5IN ECLPS

## (undated) DEVICE — DISPOSABLE TOURNIQUET CUFF SINGLE BLADDER, DUAL PORT AND QUICK CONNECT CONNECTOR: Brand: COLOR CUFF

## (undated) DEVICE — SUTURE VICRYL 3-0 J497G

## (undated) DEVICE — DRAPE SHEET LG

## (undated) DEVICE — WEBRIL COTTON UNDERCAST PADDING: Brand: WEBRIL

## (undated) DEVICE — PAD THRP 16IN WRPON MU LNG STM

## (undated) DEVICE — AMBIENT SUPER TURBOVAC 90 IFS: Brand: COBLATION

## (undated) DEVICE — SUTURE MONOCRYL 3-0 Y497G

## (undated) DEVICE — GAMMEX® PI HYBRID SIZE 7.5, STERILE POWDER-FREE SURGICAL GLOVE, POLYISOPRENE AND NEOPRENE BLEND: Brand: GAMMEX

## (undated) DEVICE — LOWER EXTREMITY: Brand: MEDLINE INDUSTRIES, INC.

## (undated) DEVICE — GAMMEX® PI HYBRID SIZE 8, STERILE POWDER-FREE SURGICAL GLOVE, POLYISOPRENE AND NEOPRENE BLEND: Brand: GAMMEX

## (undated) DEVICE — SUCTION CANISTER, 3000CC,SAFELINER: Brand: DEROYAL

## (undated) DEVICE — 3M™ STERI-STRIP™ REINFORCED ADHESIVE SKIN CLOSURES, R1547, 1/2 IN X 4 IN (12 MM X 100 MM), 6 STRIPS/ENVELOPE: Brand: 3M™ STERI-STRIP™

## (undated) DEVICE — POLAR CARE CUBE COOLING UNIT

## (undated) DEVICE — BRACE ORTH 30.5-IN 17-27IN

## (undated) DEVICE — COTTON UNDERCAST PADDING,REGULAR FINISH: Brand: WEBRIL

## (undated) DEVICE — SPINOCAN® 18 GA. X 3-1/2 IN. (90 MM) SPINAL NEEDLE: Brand: SPINOCAN®

## (undated) DEVICE — DRAPE ARTHROSCOPY KNEE

## (undated) DEVICE — TUBING IRR 16FT CNT WV 3 ASCP

## (undated) DEVICE — SOL  .9 3000ML

## (undated) DEVICE — SUTURE VICRYL 2-0 FS-1

## (undated) DEVICE — TOWEL SURG OR 17X30IN BLUE

## (undated) DEVICE — TUBING DW OUTFLOW

## (undated) DEVICE — INTENT TO BE USED WITH SUTURE MATERIAL FOR TISSUE CLOSURE: Brand: RICHARD-ALLAN® NEEDLE 3/8 CIRCLE TROCAR

## (undated) DEVICE — EXOFIN TISSUE ADHESIVE 1.0ML

## (undated) DEVICE — SUTURE FIBERLOOP #2

## (undated) DEVICE — DRAPE SRG 90X60IN BCK TBL CVR

## (undated) DEVICE — STERILE TETRA-FLEX CF, ELASTIC BANDAGE, 4" X 5.5YD: Brand: TETRA-FLEX™CF

## (undated) NOTE — Clinical Note
[facility] [employer] of Child Health Examination       Student's Name  85 [address] Jo[address] Noe Da Title                           Date    (If adding dates to the above immunization history section, put your initials by date(s) and sign here.)   ALTERNATIVE PROOF OF IMMUNITY   1 Diagnosis of asthma? Child wakes during the night coughing   Yes   No    Yes   No    Loss of function of one of paired organs? (eye/ear/kidney/testicle)   Yes   No      Birth Defects? Developmental delay? Yes   No    Yes   No  Hospitalizations? When? Signs of Insulin Resistance (hypertension, dyslipidemia, polycystic ovarian syndrome, acanthosis nigricans)      No                     At Risk  No   Lead Risk Questionnaire  Req'd for children 6 months thru 6 yrs enrolled in licensed or public [facility] Needs/Restrictions     None   SPECIAL INSTRUCTIONS/DEVICES e.g. safety glasses, glass eye, chest protector for arrhythmia, pacemaker, prosthetic device, dental bridge, false teeth, athleticsupport/cup     None   MENTAL HEALTH/OTHER   Is there anything else

## (undated) NOTE — ED AVS SNAPSHOT
Brisa Francisco   MRN: [id number]    Department:  [facility] Emergency Department   Date of Visit:  5/13/2019           Disclosure     Insurance plans vary and the physician(s) referred by the ER may not be covered by your plan.  Please contact CARE PHYSICIAN AT ONCE OR RETURN IMMEDIATELY TO THE EMERGENCY DEPARTMENT. If you have been prescribed any medication(s), please fill your prescription right away and begin taking the medication(s) as directed.   If you believe that any of the medications

## (undated) NOTE — LETTER
[facility] [employer] of Child Health Examination       Student's Name  Selene GANN Title        MD                   Date    7/11/2019   Signature                                                                                                                                              Title HEALTH HISTORY          TO BE COMPLETED AND SIGNED BY PARENT/GUARDIAN AND VERIFIED BY HEALTH CARE PROVIDER    ALLERGIES  (Food, drug, insect, other) MEDICATION  (List all prescribed or taken on a regular basis.)     Diagnosis of asthma?   Child wakes during BMI 20.71 kg/m²     DIABETES SCREENING  BMI>85% age/sex  No And any two of the following:  Family History No   Ethnic Minority  No          Signs of Insulin Resistance (hypertension, dyslipidemia, polycystic ovarian syndrome, acanthosis nigricans)    No Quick-relief  medication (e.g. Short Acting Beta Antagonist): No          Controller medication (e.g. inhaled corticosteroid):   No Other   NEEDS/MODIFICATIONS required in the school setting  None DIETARY Needs/Restrictions     None   SPECIAL INSTR

## (undated) NOTE — LETTER
VACCINE ADMINISTRATION RECORD  PARENT / GUARDIAN APPROVAL  Date: 3/13/2021  Vaccine administered to: Rosa Hidalgo     : 2005    MRN: [id number]    A copy of the appropriate Centers for Disease Control and Prevention Vaccine Information statement

## (undated) NOTE — LETTER
To Whom It May Concern:    Carlos Gibson has been under our care regarding ongoing medical issues. Because of this, she has been required to restrict her physical activities.     She may resume her usual activities, including work and school, on 11/22/2

## (undated) NOTE — LETTER
Date & Time: 8/15/2023, 4:06 PM  Patient: Sharla Floyd  Encounter Provider(s):    IVORY Rivera       To Whom It May Concern:    Sharla Floyd was seen and treated in our department on 8/15/2023. She can return to work. If you have any questions or concerns, please do not hesitate to call.        _____________________________  Evelin French.  Janeth Torres

## (undated) NOTE — LETTER
Name:  Beverley Duff Year:  10th Grade Class: Student ID No.:   Address:  [place] [address] Phone:  [phone] (home) [phone] (work) :  12year old   Name Relationship Lgl 69 Anthony Arevalo syndrome, short QT syndrome, Brugada syndrome, or catecholaminergic polymorphic ventricular tachycardia? 13. Does anyone in your family have a heart problem, pacemaker, or implanted defibrillator?      12. Has anyone in your family had unexplained faint 37. Do you have headaches with exercise? 38. Have you ever had numbness, tingling, or weakness in your arms or legs after being hit or falling? 39.Have you ever been unable to move your arms / legs after being hit /fall? 40.  Have you ever becom MVP, aortic insufficiency) Yes    Eyes/Ears/Nose/Throat:  Pupils equal    Hearing Yes    Lymph nodes Yes    Heart*  · Murmurs (auscultation standing, supine, +/- Valsalva)  · Location of point of maximal impulse (PMI) Yes    Pulses Yes    Lungs Yes    Abdo defined in the [facility] Performance-Enhancing Substance Testing Program Protocol.  We have reviewed the policy and understand that I/our student may be asked to submit to testing for the presence of performance-enhancing substances in my/his/her body either dur

## (undated) NOTE — LETTER
[address]  [address] [place], [place]      Authorization for Surgical Operation and Procedure     Date:___________                                                                                                         Time:_______ following are some, but not all, of the potential risks that can occur: fever and allergic reactions, hemolytic reactions, transmission of diseases such as Hepatitis, AIDS and Cytomegalovirus (CMV) and fluid overload.   In the event that I wish to have an a The surgeon or my attending physician will determine when the applicable recovery period ends for purposes of reinstating the DNAR order.   10. Patients having a sterilization procedure: I understand that if the procedure is successful the results will be p with my patient.     _______________________________________________________________ _____________________________  Vickie Arroyo  Physician                                                                                         (Date)

## (undated) NOTE — ED AVS SNAPSHOT
Autumn Muñiz   MRN: [id number]    Department:  [facility] Emergency Department   Date of Visit:  10/26/2017           Disclosure     Insurance plans vary and the physician(s) referred by the ER may not be covered by your plan.  Please contact CARE PHYSICIAN AT ONCE OR RETURN IMMEDIATELY TO THE EMERGENCY DEPARTMENT. If you have been prescribed any medication(s), please fill your prescription right away and begin taking the medication(s) as directed.   If you believe that any of the medications

## (undated) NOTE — LETTER
8/9/2021          To Whom It May Concern:    Becky Ariza is currently under my medical care. She is to be off work for four weeks until follow up care  Follow up is in four weeks    If you require additional information please contact our office.

## (undated) NOTE — LETTER
2/1/2022          To Whom It May Concern:    Alee Tenorio is currently under my medical care and may not return to PE/Gym at this time. Please excuse Lilliam for additional 8 weeks. She may return to school on Wednesday 02/02/2022 . Activity is restricted as follows: She is to be excuses from PE/Gym for 8 weeks. If you require additional information please contact our office.         Sincerely,    Luis Miguel Onofre MD

## (undated) NOTE — LETTER
[facility] [employer] of Child Health Examination       Student's Name  Lawence Angeline Noe D 3/13/2021   Signature                                                                                                                                              Title                           Date    (If adding dates to the above immunization history se insect, other) MEDICATION  (List all prescribed or taken on a regular basis.)     Diagnosis of asthma?   Child wakes during the night coughing   Yes   No    Yes   No    Loss of function of one of paired organs? (eye/ear/kidney/testicle)   Yes   No      Cruz Signs of Insulin Resistance (hypertension, dyslipidemia, polycystic ovarian syndrome, acanthosis nigricans)    No           At Risk  No   Lead Risk Questionnaire  Req'd for children 6 months thru 6 yrs enrolled in licensed or public school operated day car required in the school setting  None DIETARY Needs/Restrictions     None   SPECIAL INSTRUCTIONS/DEVICES e.g. safety glasses, glass eye, chest protector for arrhythmia, pacemaker, prosthetic device, dental bridge, false teeth, athleticsupport/cup     None

## (undated) NOTE — LETTER
[facility] [employer] of Child Health Examination       Student's Name  [address] Jo[address] Noe Da Title          MD                 Date  6/1/2018   Signature HEALTH HISTORY          TO BE COMPLETED AND SIGNED BY PARENT/GUARDIAN AND VERIFIED BY HEALTH CARE PROVIDER    ALLERGIES  (Food, drug, insect, other)  Patient has no known allergies.  MEDICATION  (List all prescribed or taken on a regular basis.)  No current /68   Pulse 75   Ht 5' 1\" (1.549 m)   Wt 45.9 kg (101 lb 4 oz)   BMI 19.13 kg/m²     DIABETES SCREENING  BMI>85% age/sex  No And any two of the following:  Family History yes    Ethnic Minority  No          Signs of Insulin Resistance (hypertension, Currently Prescribed Asthma Medication:            Quick-relief  medication (e.g. Short Acting Beta Antagonist): No          Controller medication (e.g. inhaled corticosteroid):   No Other   NEEDS/MODIFICATIONS required in the school setting  None DIET

## (undated) NOTE — MR AVS SNAPSHOT
[address] [facility] [address] [phone]               Thank you for choosing us for your health care visit with Mckayla Franks MD.  We are glad to serve you and happy to provide you with this summa other problems. · Friendships. Do you like your child’s friends? Do the friendships seem healthy? Make sure to talk to your child about who his or her friends are and how they spend time together.  This is the age when peer pressure can start to be a probl · Body changes in boys. At the start of puberty, the testicles drop lower and the scrotum darkens and becomes looser. Hair begins to grow in the pubic area, under the arms, and on the legs, chest, and face. The voice changes, becoming lower and deeper.  As is okay. Save soda and other sugary drinks for special occasions. · Have at least one family meal together each day. Busy schedules often limit time for sitting and talking. Sitting and eating together allows for family time.  It also lets you see what and · When riding a bike, roller-skating, or using a scooter or skateboard, your child should wear a helmet with the strap fastened.  When using roller skates, a scooter, or a skateboard, it is also a good idea for your child to wear wrist guards, elbow pads, a · Tetanus, diphtheria, and pertussis (ages 9-12)  Stay on top of social media  In this wired age, kids are much more “connected” with friends—possibly some they’ve never met in person.  To teach your child how to use social media responsibly:  · Set limits 04/02/15 : 4' 9.5\" (1.461 m) (55 %*, Z = 0.13)    * Growth percentiles are based on CDC 2-20 Years data. Body mass index is 19.47 kg/(m^2). 58%ile (Z=0.19) based on CDC 2-20 Years BMI-for-age data using vitals from 5/24/2017.     Immunization Record: 24-29 lbs               5 ml                          2                              1  29-33 lbs     6.25ml            21/2                   -XXX  34-47 lbs               7.5 ml                       3                              1&1/2  48-59 lbs 2               1 tablet  60-71 lbs                                                     2&1/2 tsp            72-95 lbs                                                     3 tsp                              3               1&1/2 table May think about appearance all the time   Starts to look outside of family for love and relationships. Influenced by peers about clothes and interests. May be influenced by peers to try risky behaviors (alcohol, tobacco, sex).   Mental Development   Mos Healthy nutrition starts as early as infancy with breastfeeding. Once your baby begins eating solid foods, introduce nutritious foods early on and often. Sometimes toddlers need to try a food 10 times before they actually accept and enjoy it.  It is also im

## (undated) NOTE — LETTER
11/8/2021          To Whom It May Concern:    Jorje Wolfe is currently under my medical care. Please excuse her from gym until her scheduled follow-up appointment on 11/22/21. If you require additional information please contact our office.

## (undated) NOTE — LETTER
5/25/2021          To Whom It May Concern:    Micheal Miller is currently under my medical care. Needs to be on light duty for 4 weeks. Activity is restricted as follows: No lifting over 25 lbs, No squatting or kneeling.     If you require additional in

## (undated) NOTE — LETTER
3/8/2022          To Whom It May Concern:    Feng Jorge is currently under my medical care and may return to work light duty for the next 3months   Activity is restricted as follows: No lifting greater than 25lbs  No kneeling or squatting    If you require additional information please contact our office.         Sincerely,    Vince Quevedo MD

## (undated) NOTE — LETTER
Date & Time: 10/12/2023, 5:58 PM  Patient: Rik Aden  Encounter Provider(s): Janki Thorpe MD       To Whom It May Concern:    Rik Aden was seen and treated in our department on 10/12/2023. She can return to work 10/14/23.     If you have any questions or concerns, please do not hesitate to call.        _____________________________  Physician/APC Signature

## (undated) NOTE — LETTER
VACCINE ADMINISTRATION RECORD  PARENT / GUARDIAN APPROVAL  Date: 2019  Vaccine administered to: Micheal Miller     : 2005    MRN: [id number]    A copy of the appropriate Centers for Disease Control and Prevention Vaccine Information statement

## (undated) NOTE — LETTER
3/8/2022          To Whom It May Concern:    Alee Tenorio is currently under my medical care and may return to gym school at this time. Please excuse Riky Stokes for Activity is restricted as follows:   Upper body exercises only  No running/jumping  No lower body weight lifting    If you require additional information please contact our office.         Sincerely,    Luis Miguel Onofre MD

## (undated) NOTE — LETTER
Patient Name: Antolin Ponce  YOB: 2005          MRN :  [id number]  Date:  12/2/2021  Referring Physician:  Sarah Major  Pt has attended 20 visits in Physical Therapy.    Reporting period: 7/16/21 to 12/2/2021 assist -MET  6. Pt will increase hip and knee strength to grossly 4+/5 to be able to get up and down from the floor safely -MET  7.  Pt will demonstrate increased hip ER/ABD strength to 5/5 to perform stepping and squatting activities without excessive femo

## (undated) NOTE — LETTER
6/29/2021              [address]        [facility] [address]         To Whom It May Concern,        Ruby Landis is under my care and is scheduled for surgery July 8th, 2021. She will require 4 weeks off work.       Pl

## (undated) NOTE — LETTER
9/21/2021          To Whom It May Concern:    Angel Dow is currently under my medical care and may  return to work at this time.     She may return to work on 9/27/21  Activity is restricted as follows:    no lifting > 25 lbs, no squatting or kneeling

## (undated) NOTE — LETTER
9/21/2021          To Whom It May Concern:    Bruce Johnson is currently under my medical care and may  return to school at this time.     Please excuse Lilliam for the next 8 weeks, at which time we will see her in the office to re evaluate    Activity is

## (undated) NOTE — LETTER
2/12/2018          To Whom It May Concern:    Micheal Miller is currently under my medical care and may return to gym with running and jumping at her own pace for the rest of the school year .       If you require additional information please contact our

## (undated) NOTE — LETTER
Date: 3/12/2024    Patient Name: Lilliam Temple          To Whom it may concern:    This letter has been written at the patient's request. The above patient was seen at [facility] for treatment of a medical condition.    This patient should be excused from attending work from 3/12/24 through 3/29/24.  Patient will be seen in clinic prior to return to work for reassessment and reevaluation.  Updated work note will be provided at that time.      Sincerely,    Mookie Day PA-C

## (undated) NOTE — LETTER
8/9/2021          To Whom It May Concern:    Alayna eFrro is currently under my medical care. She may return to school with crutches. Please allow additional time to get to and from classes while using crutches.   NO PE  Follow up in 4 weeks    If you

## (undated) NOTE — LETTER
May 20, 2019      To whom it may concern:     This is to certify that Bruce Johnson, YOB: 2005:    Exclude gym/physical education activities, Please issue second set of text books due to lifting limitations, Release from class 5 minutes

## (undated) NOTE — LETTER
11/23/2021              [address]        [place] [address]         To Whom It May Concern,    Chayitotianna Murphy may not participate in gym for the next 6 weeks. Please call with questions or concerns.       Since

## (undated) NOTE — LETTER
1/7/2022          To Whom It May Concern:    Fatimah Bazzi is currently under my medical care, she may return to school on 1/10/2022.   Activity is restricted as follows: Must use crutches, please allow her to use the Elevator, allow extra time in between

## (undated) NOTE — LETTER
Date & Time: 3/3/2023, 9:51 AM  Patient: Lisa Isabel  Encounter Provider(s):    Sujata Hodge MD       To Whom It May Concern:    Lisa Isabel was seen and treated in our department on 3/3/2023. She can return to work.     If you have any questions or concerns, please do not hesitate to call.        _____________________________  Physician/APC Signature

## (undated) NOTE — Clinical Note
VACCINE ADMINISTRATION RECORD  PARENT / GUARDIAN APPROVAL  Date: 2017  Vaccine administered to: Mere Jose Maria     : 2005    MRN: [id number]    A copy of the appropriate Centers for Disease Control and Prevention Vaccine Information statement

## (undated) NOTE — LETTER
VACCINE ADMINISTRATION RECORD  PARENT / GUARDIAN APPROVAL  Date: 2018  Vaccine administered to: Radha Cleveland     : 2005    MRN: [id number]    A copy of the appropriate Centers for Disease Control and Prevention Vaccine Information statement h

## (undated) NOTE — LETTER
Name:  Rickie Bernal Year:   Class: Student ID No.:   Address:  [address] [place] [address] Phone:  [phone] (home)  :  15year old   Name Relationship [address] Work Phone Home Phone Mobile Phone   1. Phillip Bydevorah catecholaminergic polymorphic ventricular tachycardia? 13. Does anyone in your family have a heart problem, pacemaker, or implanted defibrillator? 12. Has anyone in your family had unexplained fainting, seizures, or near drowning?      BONE AND JOIN numbness, tingling, or weakness in your arms or legs after being hit or falling? 39.Have you ever been unable to move your arms / legs after being hit /fall? 40. Have you ever become ill while exercising in the heat?     41.  Do you get frequent mus Yes    Lymph nodes Yes    Heart*  · Murmurs (auscultation standing, supine, +/- Valsalva)  · Location of point of maximal impulse (PMI) Yes    Pulses Yes    Lungs Yes    Abdomen Yes    Genitourinary (males only)* N/A    Skin:  HSV, lesions suggestive of MR that I/our student may be asked to submit to testing for the presence of performance-enhancing substances in my/his/her body either during [employer] state series events or during the school day, and I/our student do/does hereby agree to submit to such testing a

## (undated) NOTE — LETTER
6/15/2023          To Whom It May Concern:    Whit Palomino is currently under my medical care and may return to  work with the following restrictions: she must take a 15 minutes break from standing every 3 hours for the next 6 weeks. If you require additional information please contact our office.         Sincerely,    Hannah Mcleod MD

## (undated) NOTE — LETTER
10/13/2021              Alayna Ferro        [address] [facility] [address]         To Whom It May Concern,      Evaristo Wu was seen in my office and should be excused if late for work today. Please call my office with any questions.

## (undated) NOTE — LETTER
Date: 3/27/2024    Patient Name: Lilliam Temple          To Whom it may concern:    This letter has been written at the patient's request. The above patient was seen at [facility] for treatment of a medical condition.      The patient may return to work on 3/28/24 with the following limitations: for the next two weeks please allow patient to sit for 5-10 minutes every hour of her shift. She can return to full duty without restrictions after two weeks. If there are any questions or concerns, please do not hesitate to contact our office.      Sincerely,    Mookie Day PA-C

## (undated) NOTE — LETTER
[address] [place] [address]      Authorization for Surgical Operation and Procedure     Date:___________                                                                                                         Time:_______ following are some, but not all, of the potential risks that can occur: fever and allergic reactions, hemolytic reactions, transmission of diseases such as Hepatitis, AIDS and Cytomegalovirus (CMV) and fluid overload.   In the event that I wish to have an a The surgeon or my attending physician will determine when the applicable recovery period ends for purposes of reinstating the DNAR order.   10. Patients having a sterilization procedure: I understand that if the procedure is successful the results will be p with my patient.     _______________________________________________________________ _____________________________  [facility] Physician)                                                                                         (Date)

## (undated) NOTE — LETTER
2022              Dank Temple ( 2005)         [address] [facility] [address]         Immunization History   Administered Date(s) Administered   • Covid-19 Vaccine Pfizer 30 mcg/0.3 ml 2021, 2021   •

## (undated) NOTE — ED AVS SNAPSHOT
Parent/Legal Guardian Access to the Online Equals6 Record of a Patient 15to 16Years Old  Return completed form by Secure email to [facility] HIM/Medical Records Department: [email or web address]     Requirements and Procedures   Under [facility] MyChart ID and password with another person, that person may be able to view my or my child’s health information, and health information about someone who has authorized me as a MyChart proxy.    ·  I agree that it is my responsibility to select a confident Sign-Up Form and I agree to its terms.        Authorization Form     Please enter Patient’s information below:   Name (last, first, middle initial) __________________________________________   Gender  Male  Female    Last 4 Digits of Social Security Number Parent/Legal Guardian Signature                                  For Patient (1517 years of age)  I agree to allow my parent/legal guardian, named above, online access to my medical information currently available and that may become available as a result

## (undated) NOTE — LETTER
5/26/2021          To Whom It May Concern:    Alayna Ferro is currently under my medical care. She may return to work on 5/25/21 with the following restrictions:  Needs to be on light duty for 4 weeks.     Activity is restricted as follows:  No lifting o

## (undated) NOTE — LETTER
Date: 2/6/2018    Patient Name: Antolin Ponce          To Whom it may concern: This letter has been written at the patient's request. The above patient was seen at the [facility] for treatment of a medical condition.     This patient sh

## (undated) NOTE — LETTER
1/7/2022          To Whom It May Concern:    Veto Marcial is currently under my medical care and may not return to work at this time. Please excuse Lilliam for the next 3 months. If you require additional information please contact our office.